# Patient Record
Sex: MALE | Race: WHITE | NOT HISPANIC OR LATINO | Employment: FULL TIME | URBAN - METROPOLITAN AREA
[De-identification: names, ages, dates, MRNs, and addresses within clinical notes are randomized per-mention and may not be internally consistent; named-entity substitution may affect disease eponyms.]

---

## 2018-01-19 ENCOUNTER — HOSPITAL ENCOUNTER (OUTPATIENT)
Dept: FAMILY MEDICINE CLINIC | Facility: CLINIC | Age: 43
Setting detail: SPECIMEN
Discharge: HOME | End: 2018-01-19
Attending: FAMILY MEDICINE | Admitting: FAMILY MEDICINE

## 2018-01-19 LAB
ALBUMIN SERPL-MCNC: 4.3 G/DL (ref 3.5–4.8)
ALBUMIN/GLOB SERPL: 1.8 {RATIO} (ref 1–1.7)
ALP SERPL-CCNC: 17 IU/L (ref 32–91)
ALT SERPL-CCNC: 25 IU/L (ref 17–63)
ANION GAP SERPL CALC-SCNC: 13.2 MMOL/L (ref 10–20)
AST SERPL-CCNC: 20 IU/L (ref 15–41)
BASOPHILS # BLD AUTO: 0.1 10*3/UL (ref 0–0.2)
BASOPHILS NFR BLD AUTO: 1 % (ref 0–2)
BILIRUB SERPL-MCNC: 1 MG/DL (ref 0.3–1.2)
BUN SERPL-MCNC: 8 MG/DL (ref 8–20)
BUN/CREAT SERPL: 8 (ref 6.2–20.3)
CALCIUM SERPL-MCNC: 9.1 MG/DL (ref 8.9–10.3)
CHLORIDE SERPL-SCNC: 99 MMOL/L (ref 101–111)
CHOLEST SERPL-MCNC: 192 MG/DL
CHOLEST/HDLC SERPL: 4.1 {RATIO}
CONV CO2: 29 MMOL/L (ref 22–32)
CONV LDL CHOLESTEROL DIRECT: 125 MG/DL (ref 0–100)
CONV TOTAL PROTEIN: 6.7 G/DL (ref 6.1–7.9)
CREAT UR-MCNC: 1 MG/DL (ref 0.7–1.2)
DIFFERENTIAL METHOD BLD: (no result)
EOSINOPHIL # BLD AUTO: 0.2 10*3/UL (ref 0–0.3)
EOSINOPHIL # BLD AUTO: 4 % (ref 0–3)
ERYTHROCYTE [DISTWIDTH] IN BLOOD BY AUTOMATED COUNT: 12.9 % (ref 11.5–14.5)
GLOBULIN UR ELPH-MCNC: 2.4 G/DL (ref 2.5–3.8)
GLUCOSE SERPL-MCNC: 90 MG/DL (ref 65–99)
HCT VFR BLD AUTO: 45.5 % (ref 40–54)
HDLC SERPL-MCNC: 47 MG/DL
HGB BLD-MCNC: 15.5 G/DL (ref 14–18)
LDLC/HDLC SERPL: 2.7 {RATIO}
LIPID INTERPRETATION: ABNORMAL
LYMPHOCYTES # BLD AUTO: 1.8 10*3/UL (ref 0.8–4.8)
LYMPHOCYTES NFR BLD AUTO: 32 % (ref 18–42)
MCH RBC QN AUTO: 30.4 PG (ref 26–32)
MCHC RBC AUTO-ENTMCNC: 34.1 G/DL (ref 32–36)
MCV RBC AUTO: 89.1 FL (ref 80–94)
MONOCYTES # BLD AUTO: 0.5 10*3/UL (ref 0.1–1.3)
MONOCYTES NFR BLD AUTO: 8 % (ref 2–11)
NEUTROPHILS # BLD AUTO: 3 10*3/UL (ref 2.3–8.6)
NEUTROPHILS NFR BLD AUTO: 55 % (ref 50–75)
NRBC BLD AUTO-RTO: 0 /100{WBCS}
NRBC/RBC NFR BLD MANUAL: 0 10*3/UL
PLATELET # BLD AUTO: 297 10*3/UL (ref 150–450)
PMV BLD AUTO: 7.4 FL (ref 7.4–10.4)
POTASSIUM SERPL-SCNC: 4.2 MMOL/L (ref 3.6–5.1)
RBC # BLD AUTO: 5.1 10*6/UL (ref 4.6–6)
SODIUM SERPL-SCNC: 137 MMOL/L (ref 136–144)
TRIGL SERPL-MCNC: 127 MG/DL
VLDLC SERPL CALC-MCNC: 20.6 MG/DL
WBC # BLD AUTO: 5.5 10*3/UL (ref 4.5–11.5)

## 2018-03-08 ENCOUNTER — HOSPITAL ENCOUNTER (OUTPATIENT)
Dept: SLEEP MEDICINE | Facility: HOSPITAL | Age: 43
Discharge: HOME | End: 2018-03-08
Attending: PSYCHIATRY & NEUROLOGY | Admitting: PSYCHIATRY & NEUROLOGY

## 2019-05-28 ENCOUNTER — CONVERSION ENCOUNTER (OUTPATIENT)
Dept: FAMILY MEDICINE CLINIC | Facility: CLINIC | Age: 44
End: 2019-05-28

## 2019-06-04 VITALS
HEIGHT: 71 IN | WEIGHT: 165 LBS | HEART RATE: 91 BPM | DIASTOLIC BLOOD PRESSURE: 77 MMHG | SYSTOLIC BLOOD PRESSURE: 127 MMHG | BODY MASS INDEX: 23.1 KG/M2 | OXYGEN SATURATION: 96 %

## 2019-06-06 NOTE — PROGRESS NOTES
Visit Type:  Follow-up Visit  Referring Provider:  Corwin Stone MD  Primary Provider:  Bertha Olivia MD    CC:  f/u on depression, anxiety and med not working.    History of Present Illness:         This is a 43 years old male who presents with 3 months f/u on depression.  He complains of anxiety, but denies frequent crying, lack of concentration, lack of sleep, agitation, cyclic mood changes and suicidal thoughts.  The patient has tried Lexapro,   thinks making him sleepy all the time, no motivation and fatigue.        Past Medical History:     Reviewed history from 2019 and no changes required:        Migraine, headaches        Depression        sleep apnea,  hst, 2018 ahi about 40, auto cpap 6-16        allergies        Chronic dental  issues on Doxycycline.        Social History:     Reviewed history from 2018 and no changes required:        Patient has never smoked.        Passive Smoke: N        Alcohol Use: Y        Drug Use: N        HIV/High Risk: N        Regular Exercise: N                Risk Factors:     Smoked Tobacco Use:  Never smoker      Review of Systems     General       Complains of fatigue and sleep disorder.    Resp       Denies shortness of breath.    Psych       Complains of anxiety and change in sleep pattern.       Denies thoughts of suicide and depression.      Vital Signs:    Patient Profile:    43 Years Old Male  Height:     70.5 inches  Weight:     165 pounds  BMI:        23.34     O2 Sat:     96 %  Temp:       98.0 degrees F oral  Pulse rate: 91 / minute  BP Sittin / 77  (right arm)    Cuff size:  regular      Problems: Active problems were reviewed with the patient during this visit.  Medications: Medications were reviewed with the patient during this visit.  Allergies: Allergies were reviewed with the patient during this visit.  No Known Allergy.        Vitals Entered By: Sandee PATIÑO (May 28, 2019 10:07 AM)      Physical Exam    General:      well  developed, well nourished, in no acute distress.    Neurologic:      no focal deficits, cranial nerves II-XII grossly intact with normal sensation, reflexes, coordination, muscle strength and tone.    Psych:      alert and cooperative; normal mood and affect; normal attention span and concentration.        Blood Pressure:  Today's BP: 127/77 mm Hg    Labwork:   Most Recent Lab Results:   LDL: 125 mg/dL 01/19/2018    Active Medications (reviewed today):  FLUTICASONE 50MCG NASAL SP (120) RX (FLUTICASONE PROPIONATE) INHALE 1 SPRAY IN EACH NOSTRIL EVERY DAY  ESCITALOPRAM OXALATE 20 MG ORAL TABLET (ESCITALOPRAM OXALATE) Take one (1) tablet by mouth daily.  MAG GLYCINATE 100 MG ORAL TABLET (MAGNESIUM BISGLYCINATE) Take 2 tablet twice a day.  MENS MULTIVITAMIN PLUS ORAL TABLET (MULTIPLE VITAMINS-MINERALS) Take 1 tablet by mouth daily  ALLEGRA ALLERGY 180 MG ORAL TABLET (FEXOFENADINE HCL) Take 1 tablet by mouth daily  DOXYCYCLINE HYCLATE 20 MG ORAL TABLET (DOXYCYCLINE HYCLATE) TK 1 T PO  BID    Current Allergies (reviewed today):  No known allergies        Impression & Recommendations:    Problem # 1:  Depressive Disorder Nos (ICD-311) (QHW68-P39.9)  Assessment: Unchanged  Taper off Lexapro due to side effects, new Rx Cymbalta. recommend counselling, patient will call us back.    The following medications were removed from the medication list:     Escitalopram Oxalate 20 Mg Oral Tablet (Escitalopram oxalate) ..... Take one (1) tablet by mouth daily.    His updated medication list for this problem includes:     Cymbalta 30 Mg Oral Capsule Delayed Release Particles (Duloxetine hcl) ..... Take 1 tablet by mouth daily     Patient agrees to call if any worsening of symptoms or thoughts of doing harm arise. Verified that the patient has no suicidal ideation at this time.     Medications Added to Medication List This Visit:  1)  Cymbalta 30 Mg Oral Capsule Delayed Release Particles (Duloxetine hcl) .... Take 1 tablet by mouth  daily        Patient Instructions:  1)  Please schedule a follow-up appointment in 1 month.          Medications:  CYMBALTA 30 MG ORAL CAPSULE DELAYED RELEASE PARTICLES (DULOXETINE HCL) Take 1 tablet by mouth daily  #30[Capsule] x 3      Entered and Authorized by:  Corwin Stone MD      Electronically signed by:   Corwin Stone MD on 05/28/2019      Method used:    Electronically to               [id number] (retail)              [address]              [place], [place]  [id number]              Ph: [phone]              Fax: [phone]      Note to Pharmacy: Route: ORAL;       RxID:   [id number]                Medication Administration    Orders Added:  1)  Ofc Vst, Est Level III [48198]  ]      Electronically signed by Corwin Stone MD on 05/28/2019 at 10:37 AM  ________________________________________________________________________       Disclaimer: Converted Note message may not contain all data elements that existed in the legacy source system. Please see [employer] for the original note details.

## 2019-06-24 ENCOUNTER — OFFICE VISIT (OUTPATIENT)
Dept: FAMILY MEDICINE CLINIC | Facility: CLINIC | Age: 44
End: 2019-06-24

## 2019-06-24 ENCOUNTER — LAB (OUTPATIENT)
Dept: FAMILY MEDICINE CLINIC | Facility: CLINIC | Age: 44
End: 2019-06-24

## 2019-06-24 VITALS
BODY MASS INDEX: 23.66 KG/M2 | SYSTOLIC BLOOD PRESSURE: 127 MMHG | TEMPERATURE: 97 F | OXYGEN SATURATION: 99 % | HEIGHT: 71 IN | DIASTOLIC BLOOD PRESSURE: 76 MMHG | WEIGHT: 169 LBS | HEART RATE: 71 BPM

## 2019-06-24 DIAGNOSIS — F43.20 ANACLITIC DEPRESSION: Primary | ICD-10-CM

## 2019-06-24 DIAGNOSIS — F43.20 ANACLITIC DEPRESSION: ICD-10-CM

## 2019-06-24 DIAGNOSIS — J30.1 SEASONAL ALLERGIC RHINITIS DUE TO POLLEN: ICD-10-CM

## 2019-06-24 PROBLEM — J30.9 ALLERGIC RHINITIS: Status: ACTIVE | Noted: 2018-01-18

## 2019-06-24 LAB
ANION GAP SERPL CALCULATED.3IONS-SCNC: 13 MMOL/L (ref 10–20)
BASOPHILS # BLD AUTO: 0.1 10*3/MM3 (ref 0–0.2)
BASOPHILS NFR BLD AUTO: 1.4 % (ref 0–1.5)
BUN BLD-MCNC: 10 MG/DL (ref 8–20)
BUN/CREAT SERPL: 12.5 (ref 6.2–20.3)
CALCIUM SPEC-SCNC: 10.2 MG/DL (ref 8.9–10.3)
CHLORIDE SERPL-SCNC: 98 MMOL/L (ref 101–111)
CO2 SERPL-SCNC: 28 MMOL/L (ref 22–32)
CREAT BLD-MCNC: 0.8 MG/DL (ref 0.7–1.2)
DEPRECATED RDW RBC AUTO: 42 FL (ref 37–54)
EOSINOPHIL # BLD AUTO: 0.3 10*3/MM3 (ref 0–0.4)
EOSINOPHIL NFR BLD AUTO: 3.8 % (ref 0.3–6.2)
ERYTHROCYTE [DISTWIDTH] IN BLOOD BY AUTOMATED COUNT: 13.3 % (ref 12.3–15.4)
GFR SERPL CREATININE-BSD FRML MDRD: 107 ML/MIN/1.73
GLUCOSE BLD-MCNC: 96 MG/DL (ref 65–99)
HCT VFR BLD AUTO: 45 % (ref 37.5–51)
HGB BLD-MCNC: 15.8 G/DL (ref 13–17.7)
LYMPHOCYTES # BLD AUTO: 2.1 10*3/MM3 (ref 0.7–3.1)
LYMPHOCYTES NFR BLD AUTO: 29.2 % (ref 19.6–45.3)
MCH RBC QN AUTO: 31.5 PG (ref 26.6–33)
MCHC RBC AUTO-ENTMCNC: 35.1 G/DL (ref 31.5–35.7)
MCV RBC AUTO: 89.6 FL (ref 79–97)
MONOCYTES # BLD AUTO: 0.6 10*3/MM3 (ref 0.1–0.9)
MONOCYTES NFR BLD AUTO: 8.2 % (ref 5–12)
NEUTROPHILS # BLD AUTO: 4.1 10*3/MM3 (ref 1.7–7)
NEUTROPHILS NFR BLD AUTO: 57.4 % (ref 42.7–76)
NRBC BLD AUTO-RTO: 0 /100 WBC (ref 0–0.2)
PLATELET # BLD AUTO: 286 10*3/MM3 (ref 140–450)
PMV BLD AUTO: 7.6 FL (ref 6–12)
POTASSIUM BLD-SCNC: 4.2 MMOL/L (ref 3.6–5.1)
RBC # BLD AUTO: 5.02 10*6/MM3 (ref 4.14–5.8)
SODIUM BLD-SCNC: 139 MMOL/L (ref 136–144)
WBC NRBC COR # BLD: 7.1 10*3/MM3 (ref 3.4–10.8)

## 2019-06-24 PROCEDURE — 80048 BASIC METABOLIC PNL TOTAL CA: CPT | Performed by: FAMILY MEDICINE

## 2019-06-24 PROCEDURE — 36415 COLL VENOUS BLD VENIPUNCTURE: CPT | Performed by: FAMILY MEDICINE

## 2019-06-24 PROCEDURE — 85025 COMPLETE CBC W/AUTO DIFF WBC: CPT | Performed by: FAMILY MEDICINE

## 2019-06-24 PROCEDURE — 99213 OFFICE O/P EST LOW 20 MIN: CPT | Performed by: FAMILY MEDICINE

## 2019-06-24 RX ORDER — FLUTICASONE PROPIONATE 50 MCG
1 SPRAY, SUSPENSION (ML) NASAL DAILY
COMMUNITY
Start: 2018-06-03

## 2019-06-24 RX ORDER — DULOXETIN HYDROCHLORIDE 30 MG/1
1 CAPSULE, DELAYED RELEASE ORAL DAILY
Refills: 3 | COMMUNITY
Start: 2019-06-18 | End: 2020-07-28

## 2019-06-24 RX ORDER — CETIRIZINE HYDROCHLORIDE 10 MG/1
10 TABLET ORAL DAILY
COMMUNITY

## 2019-06-24 RX ORDER — DOXYCYCLINE HYCLATE 20 MG
1 TABLET ORAL DAILY
COMMUNITY
Start: 2019-06-20 | End: 2020-07-28

## 2019-06-24 RX ORDER — MAGNESIUM GLYCINATE 100 MG
1 TABLET ORAL DAILY
COMMUNITY
Start: 2018-01-18

## 2019-06-24 NOTE — ASSESSMENT & PLAN NOTE
Symptoms are improving advised to continue Cymbalta.  The patient verified not suicidal at the time of visit, he will call our office if there is any worsening of symptoms.

## 2019-06-24 NOTE — PROGRESS NOTES
Subjective   Juan José Bauer is a 43 y.o. male.     The patient presents for one-month follow-up on depression.  As the patient noticed improvement in symptoms he is taking Cymbalta which is helping.      Also has a history of seasonal allergies taking antihistamines as needed he denies cough, runny nose, sneezing and shortness of breath      Depression   Visit Type: follow-up  Patient presents with the following symptoms: nervousness/anxiety.  Patient is not experiencing: depressed mood, excessive worry, fatigue, feelings of hopelessness, feelings of worthlessness, insomnia, irritability, palpitations and shortness of breath.  Frequency of symptoms: most days   Severity: mild   Sleep quality: good      Allergic Reaction   Pertinent negatives include no abdominal pain, chest pain, coughing, diarrhea, rash, vomiting or wheezing.          Review of Systems   Constitutional: Negative for fatigue, fever and irritability.   HENT: Negative for postnasal drip and sore throat.    Respiratory: Negative for cough, shortness of breath and wheezing.    Cardiovascular: Negative for chest pain and palpitations.   Gastrointestinal: Negative for abdominal pain, diarrhea, nausea and vomiting.   Endocrine: Negative for cold intolerance and polydipsia.   Skin: Negative for rash.   Neurological: Negative for dizziness and headache.   Psychiatric/Behavioral: Negative for depressed mood. The patient is nervous/anxious. The patient does not have insomnia.        Objective   Physical Exam   Constitutional: He is oriented to person, place, and time. He appears well-developed.   HENT:   Head: Normocephalic.   Eyes: EOM are normal. Pupils are equal, round, and reactive to light.   Neck: Normal range of motion. Neck supple.   Cardiovascular: Normal rate and regular rhythm.   Pulmonary/Chest: Breath sounds normal. He has no wheezes. He exhibits no tenderness.   Abdominal: Soft. Bowel sounds are normal.   Musculoskeletal: Normal range of  motion.   Neurological: He is alert and oriented to person, place, and time.   Skin: No rash noted.   Psychiatric: He has a normal mood and affect.   Vitals reviewed.        Assessment/Plan .  Problems Addressed this Visit        Respiratory    Allergic rhinitis     Discussed environmental measurements and avoidance of triggers.            Other    Anaclitic depression - Primary     Symptoms are improving advised to continue Cymbalta.  The patient verified not suicidal at the time of visit, he will call our office if there is any worsening of symptoms.         Relevant Medications    DULoxetine (CYMBALTA) 30 MG capsule    Other Relevant Orders    CBC w AUTO Differential    Basic metabolic panel

## 2019-07-17 ENCOUNTER — OFFICE VISIT (OUTPATIENT)
Dept: NEUROLOGY | Facility: CLINIC | Age: 44
End: 2019-07-17

## 2019-07-17 VITALS
SYSTOLIC BLOOD PRESSURE: 127 MMHG | DIASTOLIC BLOOD PRESSURE: 76 MMHG | HEIGHT: 71 IN | HEART RATE: 73 BPM | WEIGHT: 179.6 LBS | BODY MASS INDEX: 25.15 KG/M2

## 2019-07-17 DIAGNOSIS — G47.33 OBSTRUCTIVE SLEEP APNEA: Primary | ICD-10-CM

## 2019-07-17 PROCEDURE — 99212 OFFICE O/P EST SF 10 MIN: CPT | Performed by: PSYCHIATRY & NEUROLOGY

## 2019-07-17 NOTE — PROGRESS NOTES
Sleep medicine follow-up visit    Juan José Bauer   1975  44 y.o. male   DATE OF SERVICE: 7/17/2019     SLEEP TESTING HISTORY:    On NPSG at [facility] , 03/08/2018 patient had Severe obstructive sleep apnea syndrome with apnea-hypopnea index of 40.2 per sleep hour, minimum SpO2 of 82%    Yearly f/u for cpap compliance, patient doing well with pap therapy. Patient uses a FFM and goes through [employer] for supplies.     The compliance data reviewed and the patient is on CPAP therapy at 9-14 cm/H2O and compliance data indicates excellent compliance with 93% usage for more than 4 hours with an average usage of 8 hours 28 minutes. AHI down to 2.2 with CPAP therapy and mean CPAP pressure 9.2 cm of water.  The patient's hypersomnia also resolved with [place] Sleepiness Scale score of 2 with CPAP therapy.  The patient feels great and is benefiting from it and is compliant.     Review of Systems   Constitutional: Negative for appetite change and fatigue.   HENT: Negative for congestion, sinus pressure and sinus pain.    Eyes: Negative for pain and itching.   Respiratory: Negative for cough and shortness of breath.    Cardiovascular: Negative for chest pain and palpitations.   Gastrointestinal: Negative for constipation and diarrhea.   Endocrine: Negative for cold intolerance and heat intolerance.   Genitourinary: Negative for difficulty urinating and frequency.   Musculoskeletal: Negative for back pain and joint swelling.   Allergic/Immunologic: Positive for environmental allergies.   Neurological: Negative for dizziness, tremors, seizures, syncope, facial asymmetry, speech difficulty, weakness, light-headedness, numbness and headaches.   Psychiatric/Behavioral: Negative for agitation and confusion.     I reviewed and addressed ROS entered by MA.      Current Outpatient Medications:   •  cetirizine (zyrTEC) 10 MG tablet, Take 10 mg by mouth Daily., Disp: , Rfl:   •  doxycycline (PERIOSTAT) 20 MG tablet, Take 1 tablet by mouth  Daily., Disp: , Rfl:   •  DULoxetine (CYMBALTA) 30 MG capsule, Take 1 capsule by mouth Daily., Disp: , Rfl: 3  •  fluticasone (FLONASE) 50 MCG/ACT nasal spray, 1 spray into the nostril(s) as directed by provider Daily., Disp: , Rfl:   •  Magnesium Bisglycinate (MAG GLYCINATE) 100 MG tablet, Take 1 tablet by mouth Daily., Disp: , Rfl:   •  Multiple Vitamins-Minerals (MENS MULTIVITAMIN PLUS) tablet, Take 1 tablet by mouth Daily., Disp: , Rfl:   No Known Allergies     PHYSICAL EXAMINATION:  Vitals:    07/17/19 0843   BP: 127/76   Pulse: 73      Body mass index is 25.05 kg/m².       HEENT: Normal.      EXTREMITIES: No edema.     IMPRESSION: Patient with obstructive sleep apnea syndrome with hypersomnia successfully treated with CPAP therapy and is compliant and benefiting from it. Compliance 93%    RECOMMENDATIONS:   1. Continue present CPAP.   2. Follow up 1 year.     [place] SLEEPINESS SCALE  Sitting and reading  0  WatchingTV  0  Sitting, inactive, in a public place  0  As a passenger in a car for 1 hour w/o a break  0  Lying down to rest in the afternoon  2  Sitting and talking to someone  0  Sitting quietly after a lunch  0  In a car, while stopped for traffic or a light  0  Total 2          This document has been electronically signed by Joseph Seipel, MD on July 17, 2019 9:04 AM

## 2019-07-18 ENCOUNTER — TELEPHONE (OUTPATIENT)
Dept: NEUROLOGY | Facility: CLINIC | Age: 44
End: 2019-07-18

## 2019-07-18 DIAGNOSIS — G47.33 OBSTRUCTIVE SLEEP APNEA: Primary | ICD-10-CM

## 2020-07-28 ENCOUNTER — OFFICE VISIT (OUTPATIENT)
Dept: NEUROLOGY | Facility: CLINIC | Age: 45
End: 2020-07-28

## 2020-07-28 VITALS
HEART RATE: 73 BPM | BODY MASS INDEX: 24.53 KG/M2 | SYSTOLIC BLOOD PRESSURE: 122 MMHG | DIASTOLIC BLOOD PRESSURE: 82 MMHG | WEIGHT: 175.2 LBS | HEIGHT: 71 IN | TEMPERATURE: 98 F

## 2020-07-28 DIAGNOSIS — G47.33 OBSTRUCTIVE SLEEP APNEA: Primary | ICD-10-CM

## 2020-07-28 PROBLEM — G43.909 MIGRAINES: Status: ACTIVE | Noted: 2020-07-28

## 2020-07-28 PROCEDURE — 99212 OFFICE O/P EST SF 10 MIN: CPT | Performed by: PSYCHIATRY & NEUROLOGY

## 2020-07-28 RX ORDER — MELATONIN
1000 DAILY
COMMUNITY

## 2020-07-28 NOTE — PROGRESS NOTES
Sleep medicine follow-up visit    Juan José Bauer   1975  45 y.o. male   DATE OF SERVICE: 7/28/2020     Yearly f/u for cpap compliance, patient doing well with pap therapy. Patient uses a FFM and goes through [employer] for supplies.     SLEEP TESTING HISTORY:    On NPSG at [facility] , 03/08/2018 patient had Severe obstructive sleep apnea syndrome with apnea-hypopnea index of 40.2 per sleep hour, minimum SpO2 of 82%    The compliance data reviewed and the patient is on CPAP therapy at 9-14 cm/H2O and compliance data indicates excellent compliance with 90% usage for more than 4 hours with an average usage of 6 hours 53 minutes. AHI down to 2.5       The patient's hypersomnia also resolved with [place] Sleepiness Scale score of 0 with CPAP therapy.      The patient feels great and is benefiting from it and is compliant.     Review of Systems   Constitutional: Negative for appetite change and fatigue.   HENT: Negative for congestion, sinus pressure and sinus pain.    Eyes: Negative for pain and itching.   Respiratory: Positive for apnea. Negative for cough and shortness of breath.    Cardiovascular: Negative for chest pain and palpitations.   Gastrointestinal: Negative for constipation and diarrhea.   Endocrine: Negative for cold intolerance and heat intolerance.   Genitourinary: Negative for difficulty urinating and frequency.   Musculoskeletal: Negative for back pain and joint swelling.   Allergic/Immunologic: Positive for environmental allergies.   Neurological: Negative for dizziness, tremors, seizures, syncope, facial asymmetry, speech difficulty, weakness, light-headedness, numbness and headaches.   Psychiatric/Behavioral: Negative for agitation and confusion.     I reviewed and addressed ROS entered by MA.      The following portions of the patient's history were reviewed and updated as appropriate: allergies, current medications, past family history, past medical history, past social history, past surgical  history and problem list.      Family History   Problem Relation Age of Onset   • Heart disease Father    • Heart disease Maternal Grandmother    • Heart disease Maternal Grandfather        Past Medical History:   Diagnosis Date   • Chronic dental infection     on doxycycline   • Depression    • H/O multiple allergies    • Migraines    • Sleep apnea 2018    hst, 2018 ahi about 40, auto cpap 6-16       Social History     Socioeconomic History   • Marital status:      Spouse name: Not on file   • Number of children: Not on file   • Years of education: Not on file   • Highest education level: Not on file   Tobacco Use   • Smoking status: Never Smoker   • Smokeless tobacco: Never Used   Substance and Sexual Activity   • Alcohol use: Yes     Alcohol/week: 2.0 standard drinks     Types: 1 Cans of beer, 1 Shots of liquor per week   • Drug use: No         Current Outpatient Medications:   •  cetirizine (zyrTEC) 10 MG tablet, Take 10 mg by mouth Daily., Disp: , Rfl:   •  cholecalciferol (VITAMIN D3) 25 MCG (1000 UT) tablet, Take 1,000 Units by mouth Daily., Disp: , Rfl:   •  fluticasone (FLONASE) 50 MCG/ACT nasal spray, 1 spray into the nostril(s) as directed by provider Daily., Disp: , Rfl:   •  lisdexamfetamine (VYVANSE) 60 MG capsule, , Disp: , Rfl:   •  Magnesium Bisglycinate (MAG GLYCINATE) 100 MG tablet, Take 1 tablet by mouth Daily., Disp: , Rfl:   •  Multiple Vitamins-Minerals (MENS MULTIVITAMIN PLUS) tablet, Take 1 tablet by mouth Daily., Disp: , Rfl:     No Known Allergies     PHYSICAL EXAMINATION:  Vitals:    07/28/20 1728   BP: 122/82   Pulse: 73   Temp: 98 °F (36.7 °C)      Body mass index is 24.44 kg/m².       HEENT: Normal.      EXTREMITIES: No edema.     IMPRESSION:     Patient with obstructive sleep apnea syndrome with hypersomnia successfully treated with CPAP therapy and is compliant and benefiting from it.         RECOMMENDATIONS:   1. Continue present CPAP.   2. Follow up 1 year.     [place]  SLEEPINESS SCALE  Sitting and reading  0  WatchingTV  0  Sitting, inactive, in a public place  0  As a passenger in a car for 1 hour w/o a break  0  Lying down to rest in the afternoon  0  Sitting and talking to someone  0  Sitting quietly after a lunch  0  In a car, while stopped for traffic or a light  0  Total 0          This document has been electronically signed by Joseph Seipel, MD on July 28, 2020 17:41

## 2020-07-29 ENCOUNTER — TELEPHONE (OUTPATIENT)
Dept: NEUROLOGY | Facility: CLINIC | Age: 45
End: 2020-07-29

## 2020-07-29 DIAGNOSIS — G47.33 OBSTRUCTIVE SLEEP APNEA: Primary | ICD-10-CM

## 2020-07-29 NOTE — TELEPHONE ENCOUNTER
----- Message from Joseph F Seipel, MD sent at 7/28/2020  5:37 PM EDT -----   FFM and goes through  [employer]

## 2021-09-17 NOTE — PROGRESS NOTES
"Chief Complaint  Sleep Apnea    Subjective          Juan José Bauer presents to [facility]  History of Present Illness  Yearly f/u for cpap compliance, patient doing well with pap therapy. He states his machine is about 2-4 yrs old.Patient uses a FFM and goes through [email or web address] for supplies.      SLEEP TESTING HISTORY:    On NPSG at [facility] , 03/08/2018 patient had Severe obstructive sleep apnea syndrome with apnea-hypopnea index of 40.2 per sleep hour, minimum SpO2 of 82%    PAP download:  The patient is on CPAP therapy at 9-14 cm/H2O.   Data indicates Minimal compliance. With 47% usage for more than 4 hours with an average usage of 4 hours 59 minutes. AHI down to 1.4 .  Average pressures 43.  Average large leak 3min.     The patient's hypersomnia has resolved       [place] Sleepiness Scale:  Sitting and reading 2 WatchingTV 0  Sitting, inactive, in a public place 0  As a passenger in a car for 1 hour w/o a break  1  Lying down to rest in the afternoon  0  Sitting and talking to someone  0  Sitting quietly after a lunch  0  In a car, while stopped for traffic or a light  0  Total 3  Review of Systems   Constitutional: Negative for chills and fever.   HENT: Negative for postnasal drip, sinus pressure and sinus pain.    Eyes: Negative for discharge and itching.   Respiratory: Negative for cough and shortness of breath.    Cardiovascular: Negative for palpitations.   Gastrointestinal: Negative for abdominal distention and abdominal pain.   Endocrine: Negative for cold intolerance and heat intolerance.   Genitourinary: Negative for difficulty urinating and frequency.   Musculoskeletal: Negative for neck pain and neck stiffness.   Neurological: Negative for dizziness and headaches.   Psychiatric/Behavioral: Negative for decreased concentration and sleep disturbance.         Objective   Vital Signs:   /78   Pulse 79   Temp 97.8 °F (36.6 °C) (Temporal)   Resp 16   Ht 180.3 cm (71\")   Wt " 75.3 kg (166 lb)   BMI 23.15 kg/m²     Physical Exam  Vitals reviewed.   Neurological:      General: No focal deficit present.      Mental Status: He is alert and oriented to person, place, and time.   Psychiatric:         Mood and Affect: Mood normal.        Result Review :                 Assessment and Plan    Diagnoses and all orders for this visit:    1. Obstructive sleep apnea (Primary)    Pt. aware of CPAP recall   The patient is compliant with and benefiting from PAP therapy.      Follow Up   Return in about 1 year (around 9/21/2022).    Patient was given instructions and counseling regarding his condition or for health maintenance advice. Please see specific information pulled into the AVS if appropriate.       This document has been electronically signed by Joseph Seipel, MD on September 21, 2021 11:35 EDT

## 2021-09-21 ENCOUNTER — OFFICE VISIT (OUTPATIENT)
Dept: NEUROLOGY | Facility: CLINIC | Age: 46
End: 2021-09-21

## 2021-09-21 ENCOUNTER — TELEPHONE (OUTPATIENT)
Dept: NEUROLOGY | Facility: CLINIC | Age: 46
End: 2021-09-21

## 2021-09-21 VITALS
HEART RATE: 79 BPM | TEMPERATURE: 97.8 F | BODY MASS INDEX: 23.24 KG/M2 | DIASTOLIC BLOOD PRESSURE: 78 MMHG | SYSTOLIC BLOOD PRESSURE: 130 MMHG | HEIGHT: 71 IN | RESPIRATION RATE: 16 BRPM | WEIGHT: 166 LBS

## 2021-09-21 DIAGNOSIS — G47.33 OBSTRUCTIVE SLEEP APNEA: Primary | ICD-10-CM

## 2021-09-21 PROCEDURE — 99213 OFFICE O/P EST LOW 20 MIN: CPT | Performed by: PSYCHIATRY & NEUROLOGY

## 2021-09-21 NOTE — TELEPHONE ENCOUNTER
----- Message from Joseph F Seipel, MD sent at 9/21/2021 11:44 AM EDT -----  FFM and goes through  [employer]

## 2023-01-23 ENCOUNTER — TELEPHONE (OUTPATIENT)
Dept: NEUROLOGY | Facility: CLINIC | Age: 48
End: 2023-01-23
Payer: COMMERCIAL

## 2023-01-23 NOTE — TELEPHONE ENCOUNTER
PATIENTS WIFE CALLING.  SHE WANTS TO HAVE PATIENT WORKED IN ON Wednesday, 1-25-23.    PLEASE ADVISE

## 2023-01-25 NOTE — TELEPHONE ENCOUNTER
No cancellations or openings for today for either provider. Unable to do as requested.    Hub ok to read

## 2023-04-11 ENCOUNTER — TELEPHONE (OUTPATIENT)
Dept: NEUROLOGY | Facility: CLINIC | Age: 48
End: 2023-04-11
Payer: COMMERCIAL

## 2023-04-11 NOTE — TELEPHONE ENCOUNTER
Provider: DR SEIPEL  Caller: NIYA  Relationship to Patient: WIFE  Phone Number: [phone]  Reason for Call: PATIENT'S WIFE CALLED TO SEE IF DR SEIPEL COULD WRITE THE SCRIPT FOR PATIENT'S NEW CPAP SUPPLIES BEFORE HIS VISIT ON 6/13/23. PATIENT HAS BEEN STRUGGLING TO USE HIS MACHINE FOR A FEW MONTHS. PLEASE REVIEW AND ADVISE. THANK YOU.

## 2023-04-12 NOTE — TELEPHONE ENCOUNTER
Called and spoke with wife will have to have face to face    Informed her they could order off [facility] or [facility]

## 2023-06-12 NOTE — PROGRESS NOTES
"Chief Complaint  Sleep Apnea    Subjective          Juan José Bauer presents to [facility]  History of Present Illness  Yearly f/u for cpap compliance, patient doing well with pap therapy, patient states after todays visit he can go  his new machine from [facility]. He  uses a FFM and goes through [employer] for supplies.       SLEEP TESTING HISTORY:    On NPSG at [facility] , 03/08/2018 patient had Severe obstructive sleep apnea syndrome with apnea-hypopnea index of 40.2 per sleep hour, minimum SpO2 of 82%    PAP download: will review when available  Using old machine with out difficulty,   Report from 2021  PAP download:  The patient is on CPAP therapy at 9-14 cm/H2O.   Data indicates Minimal compliance. With 47% usage for more than 4 hours with an average usage of 4 hours 59 minutes. AHI down to 1.4 .  Average pressures 43.  Average large leak 3min.     The patient's hypersomnia has resolved       [place] Sleepiness Scale:  Sitting and reading 2 WatchingTV 0  Sitting, inactive, in a public place 1  As a passenger in a car for 1 hour w/o a break  0  Lying down to rest in the afternoon  3  Sitting and talking to someone  0  Sitting quietly after a lunch  2  In a car, while stopped for traffic or a light  0  Total 8    Review of Systems   Neurological:  Positive for headaches.   Psychiatric/Behavioral:  Positive for sleep disturbance. The patient is nervous/anxious.    All other systems reviewed and are negative.      Objective   Vital Signs:   /85   Pulse 65   Resp 16   Ht 180.3 cm (71\")   Wt 74.4 kg (164 lb)   BMI 22.87 kg/m²     Physical Exam  Vitals reviewed.   Constitutional:       Appearance: Normal appearance.   Pulmonary:      Effort: Pulmonary effort is normal. No respiratory distress.   Neurological:      General: No focal deficit present.      Mental Status: He is alert and oriented to person, place, and time.   Psychiatric:         Mood and Affect: Mood normal.      Result " Review :                 Assessment and Plan    Diagnoses and all orders for this visit:    1. Obstructive sleep apnea (Primary)  -     PAP Therapy      Will send order for CPAP supplies   Continue  cpap at same pressure   The patient is compliant with and benefiting from PAP therapy.      Follow Up   Return in about 1 year (around 6/13/2024).    Patient was given instructions and counseling regarding his condition or for health maintenance advice. Please see specific information pulled into the AVS if appropriate.       This document has been electronically signed by Joseph Seipel, MD on June 13, 2023 10:56 EDT

## 2023-06-13 ENCOUNTER — OFFICE VISIT (OUTPATIENT)
Dept: NEUROLOGY | Facility: CLINIC | Age: 48
End: 2023-06-13
Payer: COMMERCIAL

## 2023-06-13 VITALS
BODY MASS INDEX: 22.96 KG/M2 | HEART RATE: 65 BPM | HEIGHT: 71 IN | RESPIRATION RATE: 16 BRPM | SYSTOLIC BLOOD PRESSURE: 145 MMHG | DIASTOLIC BLOOD PRESSURE: 85 MMHG | WEIGHT: 164 LBS

## 2023-06-13 DIAGNOSIS — G47.33 OBSTRUCTIVE SLEEP APNEA: Primary | ICD-10-CM

## 2023-06-13 PROCEDURE — 99213 OFFICE O/P EST LOW 20 MIN: CPT | Performed by: PSYCHIATRY & NEUROLOGY

## 2024-06-05 NOTE — PROGRESS NOTES
Chief Complaint  MASTER    Subjective          Juan José Bauer presents to [facility] NEUROLOGY  History of Present Illness    Patrice Bauer is a 48-year-old male seen today in follow-up for obstructive sleep apnea and PAP management.  Patient was last seen in the office by Dr. Seipel 6/13/2023.  He was diagnosed with obstructive sleep apnea in 2018 from a sleep study revealed severe MASTER (AHI  40.2).  Patient was issued a DreamStation 2 advanced auto CPAP on 1/5/2023.  He currently uses a fullface mask and receives resupply through [employer].  Reports that he benefits from PAP therapy because it improves his sleep.  He has been inconsistent about using it over 4 hours because of his work schedule.  He often has schedule changes and gets called in the middle of his sleep or every 30 minutes.  He is often on-call and must leave his house immediately to present to work during those times.    Patient reports he has migraine headaches about once a month.  He takes Excedrin Migraine to treat which is most of the time effective in resolving headaches.  He notices that migraines occur more frequently when he gets poor sleep.  He is not interested in trying a prescription  medication for acute migraine at this time because of potential side effects.    Compliance report pulled from care  (5/7/2024 - 6/5/2024):  CPAP 9 to 14 cm H2O, a flex 3  33.3% over 4 hours for the last 90 days, 46.7% over 4 hours for the last 30 days  Over the last 90 days patient attempted to use device 67.8%  90% CPAP pressure 10 cm H2O  Average unintended leak 6.8 L/min  AHI 1.3       Sleep testing history:    On NPSG at [facility] , 03/08/2018 patient had Severe obstructive sleep apnea syndrome with apnea-hypopnea index of 40.2 per sleep hour, minimum SpO2 of 82%     PAP download:  The patient is on CPAP therapy at 9-14 cm/H2O.   Data indicates Minimal compliance. With 46.7% usage for more than 4 hours with an average  "usage of 4  hours 51 minutes. AHI down to 1.3 .  Average pressures 10 centimeter H2O.  Average unintended leak 6.8 L/min    The patient's hypersomnia has resolved       [place] Sleepiness Scale:  Sitting and reading 3 WatchingTV 2  Sitting, inactive, in a public place 2  As a passenger in a car for 1 hour w/o a break  3  Lying down to rest in the afternoon  3  Sitting and talking to someone  0  Sitting quietly after a lunch  2  In a car, while stopped for traffic or a light  0  Total 15    Review of Systems   Neurological:  Positive for headaches.   All other systems reviewed and are negative.        Objective   Vital Signs:   /95   Pulse 80   Ht 180.3 cm (71\")   Wt 77.1 kg (170 lb)   BMI 23.71 kg/m²     Physical Exam  Vitals reviewed.   Constitutional:       Appearance: He is well-developed.   HENT:      Head: Normocephalic and atraumatic.      Comments: MMP 2, very narrow airway  Eyes:      Extraocular Movements: Extraocular movements intact.      Pupils: Pupils are equal, round, and reactive to light.   Neck:      Vascular: No carotid bruit.   Cardiovascular:      Rate and Rhythm: Normal rate.      Heart sounds: No murmur heard.  Pulmonary:      Effort: Pulmonary effort is normal.   Musculoskeletal:      Cervical back: Normal range of motion and neck supple.   Skin:     General: Skin is warm and dry.   Neurological:      Mental Status: He is alert and oriented to person, place, and time.      Cranial Nerves: No cranial nerve deficit.      Sensory: No sensory deficit.      Motor: Motor function is intact. No weakness or tremor.      Coordination: Coordination is intact. Finger-Nose-Finger Test normal.      Gait: Gait is intact.      Deep Tendon Reflexes:      Reflex Scores:       Tricep reflexes are 2+ on the right side and 2+ on the left side.       Bicep reflexes are 2+ on the right side and 2+ on the left side.       Brachioradialis reflexes are 2+ on the right side and 2+ on the left side.       " Patellar reflexes are 2+ on the right side and 2+ on the left side.       Achilles reflexes are 2+ on the right side and 2+ on the left side.  Psychiatric:         Attention and Perception: Attention normal.         Mood and Affect: Mood normal.         Speech: Speech normal.         Behavior: Behavior normal.         Cognition and Memory: Cognition and memory normal.         Judgment: Judgment normal.        Result Review :   The following data was reviewed by: NICKOLAS Zuluaga on 06/07/2024:              Assessment and Plan    Diagnoses and all orders for this visit:    1. Obstructive sleep apnea (Primary)  -     PAP Therapy    2. Migraine with aura and without status migrainosus, not intractable        Patrice Bauer is a seen today in follow-up for severe obstructive sleep apnea.  He was diagnosed in 2018 from a sleep study that revealed an AHI of 40.2.  He was set up with PAP therapy at that time but received a replacement Galo device in January 2023.  The patient is currently using auto CPAP about 76% of the time and 46.7% over 4 hours.  He reports he would use it more often and finds benefit from PAP therapy, but his sleep schedule is interrupted due to work changes and being on call.  Patient has migraine headaches about once a month which are moderately treated with Excedrin Migraine.    Physical exam is unremarkable today except for MMP 2 and very narrow airway.    He is not compliant with PAP therapy but is benefiting from using the device.  I encouraged him to increase hours of sleep if possible to at least 7 to 8 hours/night.  I believe this would also improve his migraine headaches.  We talked about other    We talked about acute migraine treatments, but the patient is concerned about side effects like drowsiness from a triptan.  If he is interested in trying something other than Excedrin Migraine, he can reach out to the office and I will order rizatriptan 10 mg for acute migraine.    Will  order PAP therapy resupply to [facility].  He will follow-up in 1 year for another compliance visit or sooner if needed      Follow Up   Return in about 1 year (around 6/7/2025).    Patient was given instructions and counseling regarding his condition or for health maintenance advice. Please see specific information pulled into the AVS if appropriate.       This document has been electronically signed by NICKOLAS Sutherland on June 7, 2024 13:53 EDT

## 2024-06-07 ENCOUNTER — OFFICE VISIT (OUTPATIENT)
Dept: NEUROLOGY | Facility: CLINIC | Age: 49
End: 2024-06-07
Payer: COMMERCIAL

## 2024-06-07 VITALS
SYSTOLIC BLOOD PRESSURE: 150 MMHG | HEIGHT: 71 IN | HEART RATE: 80 BPM | WEIGHT: 170 LBS | BODY MASS INDEX: 23.8 KG/M2 | DIASTOLIC BLOOD PRESSURE: 95 MMHG

## 2024-06-07 DIAGNOSIS — G43.109 MIGRAINE WITH AURA AND WITHOUT STATUS MIGRAINOSUS, NOT INTRACTABLE: ICD-10-CM

## 2024-06-07 DIAGNOSIS — G47.33 OBSTRUCTIVE SLEEP APNEA: Primary | ICD-10-CM

## 2024-07-31 ENCOUNTER — TELEPHONE (OUTPATIENT)
Dept: NEUROLOGY | Facility: CLINIC | Age: 49
End: 2024-07-31
Payer: COMMERCIAL

## 2024-07-31 NOTE — TELEPHONE ENCOUNTER
Spoke with Hayley at [facility]- she could not see what issue was but will look into more and call me back if any issues.  HUB ok to relay

## 2024-07-31 NOTE — TELEPHONE ENCOUNTER
PATIENT SPOUSE CALLING REGARDING PRESCRIPTION FOR [facility] FOR CPAP SUPPLIES.    EVIDENTLY THE TOP OF THE PRESCRIPTION HAS DR SEIPEL ON IT BUT IT WAS SIGNED BY CALEB CRAIG AND [facility] WILL NOT ACCEPT IT.    PT ASKING FOR A NEW PRESCRIPTION BE SENT TO [facility] THAT HAS ONE PROVIDER OR THE OTHER AT THE TOP AND SIGNED BY SAME PROVIDER    THANK YOU